# Patient Record
Sex: FEMALE | Race: WHITE | NOT HISPANIC OR LATINO | Employment: FULL TIME | URBAN - METROPOLITAN AREA
[De-identification: names, ages, dates, MRNs, and addresses within clinical notes are randomized per-mention and may not be internally consistent; named-entity substitution may affect disease eponyms.]

---

## 2023-04-03 NOTE — H&P (VIEW-ONLY)
GYNECOLOGIC ONCOLOGY CONSULT    Patient Name: KATHERINE JEFFERY Patient : 1950   Patient MRN: [id number]    Referring Physician: Karis Inman MD   Primary GYNOncologist: Rosetta Corley (Gynecological/Oncology)  Date of Service: 2023     Reason for Consult:  Treatment recommendations    History of Present Illness (Gyn Oncology):  Katherine Jeffery is a 72-year-old female, recently identified to have a palpable pelvic mass on her annual exam, who underwent a pelvic ultrasound identifying a 16.3 cm left adnexal mass, in addition to a 4.2 cm, solid right adnexal mass with a heterogeneous 10 mm endometrial lining of uncertain etiology. A CA-125 tumor marker was drawn and was normal at 34 u/mL. She presents today for treatment recommendations.    Clinical course:  3/10/23: Katherine presented for her annual wellness visit. She was experiencing lower abdominal pain that started approximately 4-6 months ago. Symptoms first started intermittently like menstrual cramps, but now had progressed to being daily. She was also having some lower abdominal bloating and urinary incontinence but denied any other concerns  3/13/23: Pelvic ultrasound showed the endometrium is abnormal and heterogeneous measuring 10 mm in a postmenopausal female. A 4.2 cm solid mass within the right adnexa and multiloculated cystic mass within the left adnexa measuring 16.3 cm. Findings are concerning for malignancy.  3/14/23:  = 34.4.  Genetic Testing (Gyn Oncology):  N/A    Interval History (Gyn Oncology):  Katherine presents today for treatment recommendations. She has not had any vaginal bleeding. She continues to have pelvic pain that feels like period cramps and radiates to her back and thigh areas on the right. She has some pressure on her bladder and has urinary incontinence. No blood in urine or stool. She denies any issues with her bowel habits. non nausea or vomiting. She denies any shortness of breath, chest pain, or tightness.  She denies any lightheadedness or dizziness. She denies any lower extremity swelling. She has been having nosebleeds only on the right side since winter and notes it intermittently once a month. Sometimes notices the bleeding by blowing her nose. She denies any pain in her nasal area. She has a history of left ear pain and was diagnosed with left sided tinnitus, symptoms of which have significantly improved.    Review of Systems:  A complete 14-point review of systems is negative except as noted in the above history of present illness.    Past Medical History:  Lower abdominal pain  Lower abdominal bloating  Hyperlipidemia  Hypertension  Glaucoma, bilateral  Catracts  Nosebleed, right  Endometrial thickening  Bilateral adnexal masses  Urinary incontinence  Chickenpox  Pneumonia x2  Left tinnitus  Low back pain, controlled on ibuprofen and Tylenol    Surgical History:  Colonoscopy   Cataract surgery bilateral  Blood transfusion once, couple of units due to placenta not separting during childbirths    OB/Gyn History:  Menarche age: 16  .  x4  LMP: Circa age 54. Regular menstrual cycles prior  Last mammogram 24. Normal  Last colonoscopy   Last Pap smear: Unknown. No abnormal Pap smears.  Denies history of HRT    Allergies#  NKA    Medications:  Multivitamins Oral Tablet  Calcium-Cholecalciferol Oral 600 mg-10 mcg (400 unit)  Cyanocobalamin Oral 2,500 mcg tablet x2  Lisinopril Oral 10 mg tablet  Omega-3 Fatty Acids Oral 1,000 mg capsule  Hydrochlorothiazide Oral 12.5 mg capsule  Lipitor (Atorvastatin Oral) 20 mg tablet  Family History:  Older brother - Pancreatic cancer,  from it  Father - Pancreatic cancer  Mother - Breast cancer.  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 Quit several years ago  No alcohol consumption. Drinks caffeine  No drug  use    Health Maintenance:    Vital Signs:  Blood pressure: 163/87, Pulse: 73, Temperature: 97.5 F, Respirations: 16, O2 sat: 98%, Pain Scale: 2, Height: 65.5 in, Weight: 175 lb, BSA: 1.88, BMI: 28.68 kg/m2     Physical Exam (Gyn Oncology):  General: Alert and oriented x3, no acute distress.  HEENT: Normocephalic, atraumatic.  Lymph node exam: No supraclavicular, submandibular, or cervical lymphadenopathy.  CV: Regular rate and rhythm, no murmurs, rubs or gallops.  Resp: Clear to auscultation bilaterally, no wheezes, rales, or rhonchi.  Abdomen: Soft, non-tender to palpation, no rebound or guarding.  Lower Extremity Exam: No lower extremity edema, no palpable cords.  Neuro: Cranial Nerves 2-12 intact, gross motor skills intact.  Genitourinary exam: Deferred.    Laboratory Data:    Imaging:  3/13/23: US Pelvis  IMPRESSION:  The endometrium is abnormal and heterogeneous measuring 10 mm in a postmenopausal female. A 4.2 cm solid mass within the right adnexa and multiloculated cystic mass within the left adnexa measuring 16.3 cm. Findings are concerning for malignancy.    Problems:  Pelvic mass  Uterine mass  Assessment & Plan (Gyn Oncology):  Ritu Jeffery is a 72-year-old female, recently identified to have a palpable pelvic mass on her annual exam, who underwent a pelvic ultrasound identifying a 16.3 cm left adnexal mass, in addition to a 4.2 cm, solid right adnexal mass with a heterogeneous 10 mm endometrial lining of uncertain etiology. A CA-125 tumor marker was drawn and was normal at 34 u/mL. She presents today for treatment recommendations.  Bilateral ovarian masses - Ritu, her , and I reviewed the large, multicystic, and complex-appearing left ovarian mass. We reviewed the normal CA-125 tumor marker at 34 u/mL, however, did discuss the need to proceed with surgical management, in order to truly and definitively rule out an underlying malignancy. We discussed this can be performed robotically with the  large cystic mass removed vaginally. We reviewed the risks of surgery, including bleeding or infection and potential damage to surrounding structures, including the bowel, bladder, and bilateral ureters. We reviewed general perioperative expectations. We discussed the need for a CT scan of the chest, abdomen, and pelvis, due to the adnexal masses of uncertain etiology.  Endometrial thickening - Ritu denies a history of postmenopausal bleeding. However, with an endometrial thickening of 10 mm, this needs to be surgically addressed. Being that we will be performing a procedure on the bilateral ovaries, we will plan to proceed with hysterectomy, which will also help facilitate removal of the 16 cm mass. We discussed the need for intraoperative frozen section analysis, in order to determine the etiology behind the endometrial thickening. We reviewed the risks of surgery as described above. We reviewed the potential performance of sentinel lymph node injection and biopsies. We will inject the cervix preoperatively, with ICG dye, in order to determine the sentinel lymph node mapping pathway, if an endometrial cancer is identified. I answered multiple questions to the best of my ability.  Thank you very much for allowing me to take part in the care of this very sweet patient.    3 minutes in reviewing records, 27 minutes in discussion, 2 minutes ordering labs.     Pain Care Management:  Pain Scale: 2    Patient Care needs:  Depressions Status: Was screened; Outcome positive: No; Screening Date: 03/23/2023; Screening Tool: PRIME MD-PHQ2; Total depression score: 0  Smoking Status: Smoking Tobacco : none found; Smokeless Tobacco : none found; Vaping : none found  Documentation assistance provided by toña Joe for Dr. Rosetta Corley, on 3/23/2023.  The patient and family/friends if present were apprised of the use of a scribe through remote viewing and all parties consented to conducting the visit in this  satish.    Rosetta Corley MD  Phone: [phone]  Fax: [phone]

## 2023-04-03 NOTE — CONSULTS
GYNECOLOGIC ONCOLOGY CONSULT    Patient Name: KATHERINE JEFFERY Patient : 1950   Patient MRN: [id number]    Referring Physician: Karis Inman MD   Primary GYNOncologist: Rosetta Corley (Gynecological/Oncology)  Date of Service: 2023     Reason for Consult:  Treatment recommendations    History of Present Illness (Gyn Oncology):  Katherine Jeffery is a 72-year-old female, recently identified to have a palpable pelvic mass on her annual exam, who underwent a pelvic ultrasound identifying a 16.3 cm left adnexal mass, in addition to a 4.2 cm, solid right adnexal mass with a heterogeneous 10 mm endometrial lining of uncertain etiology. A CA-125 tumor marker was drawn and was normal at 34 u/mL. She presents today for treatment recommendations.    Clinical course:  3/10/23: Katherine presented for her annual wellness visit. She was experiencing lower abdominal pain that started approximately 4-6 months ago. Symptoms first started intermittently like menstrual cramps, but now had progressed to being daily. She was also having some lower abdominal bloating and urinary incontinence but denied any other concerns  3/13/23: Pelvic ultrasound showed the endometrium is abnormal and heterogeneous measuring 10 mm in a postmenopausal female. A 4.2 cm solid mass within the right adnexa and multiloculated cystic mass within the left adnexa measuring 16.3 cm. Findings are concerning for malignancy.  3/14/23:  = 34.4.  Genetic Testing (Gyn Oncology):  N/A    Interval History (Gyn Oncology):  Katherine presents today for treatment recommendations. She has not had any vaginal bleeding. She continues to have pelvic pain that feels like period cramps and radiates to her back and thigh areas on the right. She has some pressure on her bladder and has urinary incontinence. No blood in urine or stool. She denies any issues with her bowel habits. non nausea or vomiting. She denies any shortness of breath, chest pain, or tightness.  She denies any lightheadedness or dizziness. She denies any lower extremity swelling. She has been having nosebleeds only on the right side since winter and notes it intermittently once a month. Sometimes notices the bleeding by blowing her nose. She denies any pain in her nasal area. She has a history of left ear pain and was diagnosed with left sided tinnitus, symptoms of which have significantly improved.    Review of Systems:  A complete 14-point review of systems is negative except as noted in the above history of present illness.    Past Medical History:  Lower abdominal pain  Lower abdominal bloating  Hyperlipidemia  Hypertension  Glaucoma, bilateral  Catracts  Nosebleed, right  Endometrial thickening  Bilateral adnexal masses  Urinary incontinence  Chickenpox  Pneumonia x2  Left tinnitus  Low back pain, controlled on ibuprofen and Tylenol    Surgical History:  Colonoscopy   Cataract surgery bilateral  Blood transfusion once, couple of units due to placenta not separting during childbirths    OB/Gyn History:  Menarche age: 16  .  x4  LMP: Circa age 54. Regular menstrual cycles prior  Last mammogram 23. Normal  Last colonoscopy   Last Pap smear: Unknown. No abnormal Pap smears.  Denies history of HRT    Allergies#  NKA    Medications:  Multivitamins Oral Tablet  Calcium-Cholecalciferol Oral 600 mg-10 mcg (400 unit)  Cyanocobalamin Oral 2,500 mcg tablet x2  Lisinopril Oral 10 mg tablet  Omega-3 Fatty Acids Oral 1,000 mg capsule  Hydrochlorothiazide Oral 12.5 mg capsule  Lipitor (Atorvastatin Oral) 20 mg tablet  Family History:  Older brother - Pancreatic cancer,  from it  Father - Pancreatic cancer  Mother - Breast cancer.  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 Quit several years ago  No alcohol consumption. Drinks caffeine  No drug  use    Health Maintenance:    Vital Signs:  Blood pressure: 163/87, Pulse: 73, Temperature: 97.5 F, Respirations: 16, O2 sat: 98%, Pain Scale: 2, Height: 65.5 in, Weight: 175 lb, BSA: 1.88, BMI: 28.68 kg/m2     Physical Exam (Gyn Oncology):  General: Alert and oriented x3, no acute distress.  HEENT: Normocephalic, atraumatic.  Lymph node exam: No supraclavicular, submandibular, or cervical lymphadenopathy.  CV: Regular rate and rhythm, no murmurs, rubs or gallops.  Resp: Clear to auscultation bilaterally, no wheezes, rales, or rhonchi.  Abdomen: Soft, non-tender to palpation, no rebound or guarding.  Lower Extremity Exam: No lower extremity edema, no palpable cords.  Neuro: Cranial Nerves 2-12 intact, gross motor skills intact.  Genitourinary exam: Deferred.    Laboratory Data:    Imaging:  3/13/23: US Pelvis  IMPRESSION:  The endometrium is abnormal and heterogeneous measuring 10 mm in a postmenopausal female. A 4.2 cm solid mass within the right adnexa and multiloculated cystic mass within the left adnexa measuring 16.3 cm. Findings are concerning for malignancy.    Problems:  Pelvic mass  Uterine mass  Assessment & Plan (Gyn Oncology):  Ritu Jeffery is a 72-year-old female, recently identified to have a palpable pelvic mass on her annual exam, who underwent a pelvic ultrasound identifying a 16.3 cm left adnexal mass, in addition to a 4.2 cm, solid right adnexal mass with a heterogeneous 10 mm endometrial lining of uncertain etiology. A CA-125 tumor marker was drawn and was normal at 34 u/mL. She presents today for treatment recommendations.  Bilateral ovarian masses - Ritu, her , and I reviewed the large, multicystic, and complex-appearing left ovarian mass. We reviewed the normal CA-125 tumor marker at 34 u/mL, however, did discuss the need to proceed with surgical management, in order to truly and definitively rule out an underlying malignancy. We discussed this can be performed robotically with the  large cystic mass removed vaginally. We reviewed the risks of surgery, including bleeding or infection and potential damage to surrounding structures, including the bowel, bladder, and bilateral ureters. We reviewed general perioperative expectations. We discussed the need for a CT scan of the chest, abdomen, and pelvis, due to the adnexal masses of uncertain etiology.  Endometrial thickening - Ritu denies a history of postmenopausal bleeding. However, with an endometrial thickening of 10 mm, this needs to be surgically addressed. Being that we will be performing a procedure on the bilateral ovaries, we will plan to proceed with hysterectomy, which will also help facilitate removal of the 16 cm mass. We discussed the need for intraoperative frozen section analysis, in order to determine the etiology behind the endometrial thickening. We reviewed the risks of surgery as described above. We reviewed the potential performance of sentinel lymph node injection and biopsies. We will inject the cervix preoperatively, with ICG dye, in order to determine the sentinel lymph node mapping pathway, if an endometrial cancer is identified. I answered multiple questions to the best of my ability.  Thank you very much for allowing me to take part in the care of this very sweet patient.    3 minutes in reviewing records, 27 minutes in discussion, 2 minutes ordering labs.     Pain Care Management:  Pain Scale: 2    Patient Care needs:  Depressions Status: Was screened; Outcome positive: No; Screening Date: 03/23/2023; Screening Tool: PRIME MD-PHQ2; Total depression score: 0  Smoking Status: Smoking Tobacco : none found; Smokeless Tobacco : none found; Vaping : none found  Documentation assistance provided by toña Joe for Dr. Rosetta Corley, on 3/23/2023.  The patient and family/friends if present were apprised of the use of a scribe through remote viewing and all parties consented to conducting the visit in this  satish.    Rosetta Corley MD  Phone: [phone]  Fax: [phone]

## 2023-04-10 RX ORDER — MULTIPLE VITAMINS W/ MINERALS TAB 9MG-400MCG
1 TAB ORAL DAILY
COMMUNITY

## 2023-04-10 RX ORDER — CHLORAL HYDRATE 500 MG
1 CAPSULE ORAL DAILY
COMMUNITY

## 2023-04-10 RX ORDER — CALCIUM CARBONATE/VITAMIN D3 600 MG-10
1 TABLET ORAL DAILY
COMMUNITY

## 2023-04-10 RX ORDER — ATORVASTATIN CALCIUM 20 MG/1
20 TABLET, FILM COATED ORAL DAILY
COMMUNITY

## 2023-04-10 RX ORDER — LISINOPRIL 10 MG/1
10 TABLET ORAL DAILY
COMMUNITY

## 2023-04-10 RX ORDER — HYDROCHLOROTHIAZIDE 12.5 MG/1
12.5 CAPSULE ORAL DAILY
COMMUNITY

## 2023-04-11 ENCOUNTER — ANESTHESIA (OUTPATIENT)
Dept: SURGERY | Facility: HOSPITAL | Age: 73
End: 2023-04-11
Payer: MEDICARE

## 2023-04-11 ENCOUNTER — ANESTHESIA EVENT (OUTPATIENT)
Dept: SURGERY | Facility: HOSPITAL | Age: 73
End: 2023-04-11
Payer: MEDICARE

## 2023-04-11 ENCOUNTER — HOSPITAL ENCOUNTER (OUTPATIENT)
Facility: HOSPITAL | Age: 73
Discharge: HOME | End: 2023-04-11
Attending: OBSTETRICS & GYNECOLOGY | Admitting: OBSTETRICS & GYNECOLOGY
Payer: MEDICARE

## 2023-04-11 VITALS
WEIGHT: 173.4 LBS | TEMPERATURE: 98 F | DIASTOLIC BLOOD PRESSURE: 70 MMHG | RESPIRATION RATE: 18 BRPM | SYSTOLIC BLOOD PRESSURE: 161 MMHG | OXYGEN SATURATION: 97 % | HEART RATE: 69 BPM

## 2023-04-11 DIAGNOSIS — G89.18 POSTOPERATIVE PAIN: Primary | ICD-10-CM

## 2023-04-11 PROCEDURE — 250N000011 HC RX IP 250 OP 636: Performed by: NURSE ANESTHETIST, CERTIFIED REGISTERED

## 2023-04-11 PROCEDURE — 258N000003 HC RX IP 258 OP 636: Performed by: ANESTHESIOLOGY

## 2023-04-11 PROCEDURE — 258N000001 HC RX 258: Performed by: OBSTETRICS & GYNECOLOGY

## 2023-04-11 PROCEDURE — 88305 TISSUE EXAM BY PATHOLOGIST: CPT | Mod: TC | Performed by: OBSTETRICS & GYNECOLOGY

## 2023-04-11 PROCEDURE — 250N000013 HC RX MED GY IP 250 OP 250 PS 637: Performed by: ANESTHESIOLOGY

## 2023-04-11 PROCEDURE — 250N000025 HC SEVOFLURANE, PER MIN: Performed by: OBSTETRICS & GYNECOLOGY

## 2023-04-11 PROCEDURE — 250N000011 HC RX IP 250 OP 636: Performed by: PHYSICIAN ASSISTANT

## 2023-04-11 PROCEDURE — 710N000009 HC RECOVERY PHASE 1, LEVEL 1, PER MIN: Performed by: OBSTETRICS & GYNECOLOGY

## 2023-04-11 PROCEDURE — 272N000001 HC OR GENERAL SUPPLY STERILE: Performed by: OBSTETRICS & GYNECOLOGY

## 2023-04-11 PROCEDURE — 258N000003 HC RX IP 258 OP 636: Performed by: NURSE ANESTHETIST, CERTIFIED REGISTERED

## 2023-04-11 PROCEDURE — 360N000080 HC SURGERY LEVEL 7, PER MIN: Performed by: OBSTETRICS & GYNECOLOGY

## 2023-04-11 PROCEDURE — 999N000141 HC STATISTIC PRE-PROCEDURE NURSING ASSESSMENT: Performed by: OBSTETRICS & GYNECOLOGY

## 2023-04-11 PROCEDURE — 250N000009 HC RX 250: Performed by: OBSTETRICS & GYNECOLOGY

## 2023-04-11 PROCEDURE — 710N000012 HC RECOVERY PHASE 2, PER MINUTE: Performed by: OBSTETRICS & GYNECOLOGY

## 2023-04-11 PROCEDURE — 88307 TISSUE EXAM BY PATHOLOGIST: CPT | Mod: TC | Performed by: OBSTETRICS & GYNECOLOGY

## 2023-04-11 PROCEDURE — 250N000011 HC RX IP 250 OP 636: Performed by: ANESTHESIOLOGY

## 2023-04-11 PROCEDURE — 370N000017 HC ANESTHESIA TECHNICAL FEE, PER MIN: Performed by: OBSTETRICS & GYNECOLOGY

## 2023-04-11 PROCEDURE — 250N000009 HC RX 250: Performed by: NURSE ANESTHETIST, CERTIFIED REGISTERED

## 2023-04-11 RX ORDER — CEFAZOLIN SODIUM/WATER 2 G/20 ML
2 SYRINGE (ML) INTRAVENOUS
Status: COMPLETED | OUTPATIENT
Start: 2023-04-11 | End: 2023-04-11

## 2023-04-11 RX ORDER — AMOXICILLIN 250 MG
1 CAPSULE ORAL 2 TIMES DAILY PRN
COMMUNITY
Start: 2023-04-11

## 2023-04-11 RX ORDER — DEXAMETHASONE SODIUM PHOSPHATE 10 MG/ML
INJECTION, SOLUTION INTRAMUSCULAR; INTRAVENOUS PRN
Status: DISCONTINUED | OUTPATIENT
Start: 2023-04-11 | End: 2023-04-11

## 2023-04-11 RX ORDER — OXYCODONE HYDROCHLORIDE 5 MG/1
5 TABLET ORAL EVERY 4 HOURS PRN
Qty: 10 TABLET | Refills: 0 | Status: SHIPPED | OUTPATIENT
Start: 2023-04-11 | End: 2023-04-14

## 2023-04-11 RX ORDER — ACETAMINOPHEN 325 MG/1
975 TABLET ORAL EVERY 6 HOURS PRN
Status: DISCONTINUED | OUTPATIENT
Start: 2023-04-11 | End: 2023-04-14 | Stop reason: HOSPADM

## 2023-04-11 RX ORDER — HALOPERIDOL 5 MG/ML
1 INJECTION INTRAMUSCULAR
Status: DISCONTINUED | OUTPATIENT
Start: 2023-04-11 | End: 2023-04-11 | Stop reason: HOSPADM

## 2023-04-11 RX ORDER — PROPOFOL 10 MG/ML
INJECTION, EMULSION INTRAVENOUS CONTINUOUS PRN
Status: DISCONTINUED | OUTPATIENT
Start: 2023-04-11 | End: 2023-04-11

## 2023-04-11 RX ORDER — MAGNESIUM SULFATE 4 G/50ML
4 INJECTION INTRAVENOUS ONCE
Status: COMPLETED | OUTPATIENT
Start: 2023-04-11 | End: 2023-04-11

## 2023-04-11 RX ORDER — SODIUM CHLORIDE, SODIUM LACTATE, POTASSIUM CHLORIDE, CALCIUM CHLORIDE 600; 310; 30; 20 MG/100ML; MG/100ML; MG/100ML; MG/100ML
INJECTION, SOLUTION INTRAVENOUS CONTINUOUS
Status: DISCONTINUED | OUTPATIENT
Start: 2023-04-11 | End: 2023-04-11 | Stop reason: HOSPADM

## 2023-04-11 RX ORDER — IBUPROFEN 200 MG
200-600 TABLET ORAL EVERY 6 HOURS PRN
COMMUNITY
Start: 2023-04-11

## 2023-04-11 RX ORDER — FENTANYL CITRATE 50 UG/ML
25 INJECTION, SOLUTION INTRAMUSCULAR; INTRAVENOUS
Status: DISCONTINUED | OUTPATIENT
Start: 2023-04-11 | End: 2023-04-14 | Stop reason: HOSPADM

## 2023-04-11 RX ORDER — CEFAZOLIN SODIUM/WATER 2 G/20 ML
2 SYRINGE (ML) INTRAVENOUS SEE ADMIN INSTRUCTIONS
Status: DISCONTINUED | OUTPATIENT
Start: 2023-04-11 | End: 2023-04-11 | Stop reason: HOSPADM

## 2023-04-11 RX ORDER — HYDROXYZINE HYDROCHLORIDE 25 MG/1
25 TABLET, FILM COATED ORAL
Status: DISCONTINUED | OUTPATIENT
Start: 2023-04-11 | End: 2023-04-14 | Stop reason: HOSPADM

## 2023-04-11 RX ORDER — FENTANYL CITRATE 50 UG/ML
50 INJECTION, SOLUTION INTRAMUSCULAR; INTRAVENOUS EVERY 5 MIN PRN
Status: DISCONTINUED | OUTPATIENT
Start: 2023-04-11 | End: 2023-04-11 | Stop reason: HOSPADM

## 2023-04-11 RX ORDER — BUPIVACAINE HYDROCHLORIDE 5 MG/ML
INJECTION, SOLUTION PERINEURAL PRN
Status: DISCONTINUED | OUTPATIENT
Start: 2023-04-11 | End: 2023-04-11 | Stop reason: HOSPADM

## 2023-04-11 RX ORDER — PROPOFOL 10 MG/ML
INJECTION, EMULSION INTRAVENOUS PRN
Status: DISCONTINUED | OUTPATIENT
Start: 2023-04-11 | End: 2023-04-11

## 2023-04-11 RX ORDER — WATER 10 ML/10ML
INJECTION INTRAMUSCULAR; INTRAVENOUS; SUBCUTANEOUS
Status: DISCONTINUED
Start: 2023-04-11 | End: 2023-04-11 | Stop reason: HOSPADM

## 2023-04-11 RX ORDER — ONDANSETRON 4 MG/1
4 TABLET, ORALLY DISINTEGRATING ORAL EVERY 30 MIN PRN
Status: DISCONTINUED | OUTPATIENT
Start: 2023-04-11 | End: 2023-04-11 | Stop reason: HOSPADM

## 2023-04-11 RX ORDER — ONDANSETRON 2 MG/ML
4 INJECTION INTRAMUSCULAR; INTRAVENOUS EVERY 30 MIN PRN
Status: DISCONTINUED | OUTPATIENT
Start: 2023-04-11 | End: 2023-04-11 | Stop reason: HOSPADM

## 2023-04-11 RX ORDER — LABETALOL HYDROCHLORIDE 5 MG/ML
5 INJECTION, SOLUTION INTRAVENOUS EVERY 10 MIN PRN
Status: DISCONTINUED | OUTPATIENT
Start: 2023-04-11 | End: 2023-04-11 | Stop reason: HOSPADM

## 2023-04-11 RX ORDER — OXYCODONE HYDROCHLORIDE 5 MG/1
5 TABLET ORAL
Status: COMPLETED | OUTPATIENT
Start: 2023-04-11 | End: 2023-04-11

## 2023-04-11 RX ORDER — ACETAMINOPHEN 325 MG/1
975 TABLET ORAL ONCE
Status: COMPLETED | OUTPATIENT
Start: 2023-04-11 | End: 2023-04-11

## 2023-04-11 RX ORDER — INDOCYANINE GREEN AND WATER 25 MG
KIT INJECTION PRN
Status: DISCONTINUED | OUTPATIENT
Start: 2023-04-11 | End: 2023-04-11 | Stop reason: HOSPADM

## 2023-04-11 RX ORDER — LIDOCAINE HYDROCHLORIDE 10 MG/ML
INJECTION, SOLUTION INFILTRATION; PERINEURAL PRN
Status: DISCONTINUED | OUTPATIENT
Start: 2023-04-11 | End: 2023-04-11

## 2023-04-11 RX ORDER — OXYCODONE HYDROCHLORIDE 5 MG/1
10 TABLET ORAL
Status: DISCONTINUED | OUTPATIENT
Start: 2023-04-11 | End: 2023-04-14 | Stop reason: HOSPADM

## 2023-04-11 RX ORDER — ONDANSETRON 2 MG/ML
INJECTION INTRAMUSCULAR; INTRAVENOUS PRN
Status: DISCONTINUED | OUTPATIENT
Start: 2023-04-11 | End: 2023-04-11

## 2023-04-11 RX ORDER — WATER 10 ML/10ML
INJECTION INTRAMUSCULAR; INTRAVENOUS; SUBCUTANEOUS PRN
Status: DISCONTINUED | OUTPATIENT
Start: 2023-04-11 | End: 2023-04-11 | Stop reason: HOSPADM

## 2023-04-11 RX ORDER — LIDOCAINE 40 MG/G
CREAM TOPICAL
Status: DISCONTINUED | OUTPATIENT
Start: 2023-04-11 | End: 2023-04-11 | Stop reason: HOSPADM

## 2023-04-11 RX ORDER — ACETAMINOPHEN 500 MG
500-1000 TABLET ORAL EVERY 6 HOURS PRN
COMMUNITY
Start: 2023-04-11

## 2023-04-11 RX ORDER — FENTANYL CITRATE 50 UG/ML
25 INJECTION, SOLUTION INTRAMUSCULAR; INTRAVENOUS EVERY 5 MIN PRN
Status: DISCONTINUED | OUTPATIENT
Start: 2023-04-11 | End: 2023-04-11 | Stop reason: HOSPADM

## 2023-04-11 RX ORDER — IBUPROFEN 200 MG
600 TABLET ORAL EVERY 6 HOURS PRN
Status: DISCONTINUED | OUTPATIENT
Start: 2023-04-11 | End: 2023-04-14 | Stop reason: HOSPADM

## 2023-04-11 RX ORDER — FENTANYL CITRATE 50 UG/ML
INJECTION, SOLUTION INTRAMUSCULAR; INTRAVENOUS PRN
Status: DISCONTINUED | OUTPATIENT
Start: 2023-04-11 | End: 2023-04-11

## 2023-04-11 RX ORDER — OXYCODONE HYDROCHLORIDE 5 MG/1
5 TABLET ORAL EVERY 4 HOURS PRN
Status: DISCONTINUED | OUTPATIENT
Start: 2023-04-11 | End: 2023-04-14 | Stop reason: HOSPADM

## 2023-04-11 RX ADMIN — SUGAMMADEX 200 MG: 100 INJECTION, SOLUTION INTRAVENOUS at 17:42

## 2023-04-11 RX ADMIN — FENTANYL CITRATE 25 MCG: 50 INJECTION, SOLUTION INTRAMUSCULAR; INTRAVENOUS at 15:38

## 2023-04-11 RX ADMIN — PHENYLEPHRINE HYDROCHLORIDE 100 MCG: 10 INJECTION INTRAVENOUS at 16:42

## 2023-04-11 RX ADMIN — OXYCODONE HYDROCHLORIDE 5 MG: 5 TABLET ORAL at 20:42

## 2023-04-11 RX ADMIN — HYDROMORPHONE HYDROCHLORIDE 0.2 MG: 1 INJECTION, SOLUTION INTRAMUSCULAR; INTRAVENOUS; SUBCUTANEOUS at 18:28

## 2023-04-11 RX ADMIN — FENTANYL CITRATE 25 MCG: 50 INJECTION, SOLUTION INTRAMUSCULAR; INTRAVENOUS at 15:41

## 2023-04-11 RX ADMIN — SODIUM CHLORIDE, POTASSIUM CHLORIDE, SODIUM LACTATE AND CALCIUM CHLORIDE: 600; 310; 30; 20 INJECTION, SOLUTION INTRAVENOUS at 12:54

## 2023-04-11 RX ADMIN — PHENYLEPHRINE HYDROCHLORIDE 100 MCG: 10 INJECTION INTRAVENOUS at 16:39

## 2023-04-11 RX ADMIN — PROPOFOL 50 MCG/KG/MIN: 10 INJECTION, EMULSION INTRAVENOUS at 15:46

## 2023-04-11 RX ADMIN — ROCURONIUM BROMIDE 10 MG: 50 INJECTION, SOLUTION INTRAVENOUS at 16:37

## 2023-04-11 RX ADMIN — HYDROMORPHONE HYDROCHLORIDE 0.4 MG: 1 INJECTION, SOLUTION INTRAMUSCULAR; INTRAVENOUS; SUBCUTANEOUS at 18:35

## 2023-04-11 RX ADMIN — DEXAMETHASONE SODIUM PHOSPHATE 10 MG: 10 INJECTION, SOLUTION INTRAMUSCULAR; INTRAVENOUS at 15:49

## 2023-04-11 RX ADMIN — Medication 2 G: at 15:34

## 2023-04-11 RX ADMIN — ROCURONIUM BROMIDE 50 MG: 50 INJECTION, SOLUTION INTRAVENOUS at 15:43

## 2023-04-11 RX ADMIN — FENTANYL CITRATE 50 MCG: 50 INJECTION, SOLUTION INTRAMUSCULAR; INTRAVENOUS at 17:59

## 2023-04-11 RX ADMIN — MAGNESIUM SULFATE HEPTAHYDRATE 4 G: 80 INJECTION, SOLUTION INTRAVENOUS at 12:55

## 2023-04-11 RX ADMIN — ACETAMINOPHEN 975 MG: 325 TABLET ORAL at 12:44

## 2023-04-11 RX ADMIN — LIDOCAINE HYDROCHLORIDE 30 MG: 10 INJECTION, SOLUTION INFILTRATION; PERINEURAL at 15:43

## 2023-04-11 RX ADMIN — SODIUM CHLORIDE, POTASSIUM CHLORIDE, SODIUM LACTATE AND CALCIUM CHLORIDE: 600; 310; 30; 20 INJECTION, SOLUTION INTRAVENOUS at 16:47

## 2023-04-11 RX ADMIN — FENTANYL CITRATE 50 MCG: 50 INJECTION, SOLUTION INTRAMUSCULAR; INTRAVENOUS at 15:31

## 2023-04-11 RX ADMIN — ONDANSETRON 4 MG: 2 INJECTION INTRAMUSCULAR; INTRAVENOUS at 17:31

## 2023-04-11 RX ADMIN — FENTANYL CITRATE 50 MCG: 50 INJECTION, SOLUTION INTRAMUSCULAR; INTRAVENOUS at 18:08

## 2023-04-11 RX ADMIN — PROPOFOL 200 MG: 10 INJECTION, EMULSION INTRAVENOUS at 15:43

## 2023-04-11 ASSESSMENT — ACTIVITIES OF DAILY LIVING (ADL)
ADLS_ACUITY_SCORE: 35

## 2023-04-11 NOTE — PROCEDURES
DATE OF SERVICE:    4/11/2023      SURGEON:    Rosetta Corley MD    ASSISTANT:   Marisol Giles PA-C     PREOPERATIVE DIAGNOSIS:   16.3 cm left pelvic mass  4.2 cm solid right ovarian mass  Endometrial thickening  Normal  tumor marker    POSTOPERATIVE DIAGNOSIS:   Same  Reactive peritoneal changes    PROCEDURE:  Pelvic washings, robotic assisted total laparoscopic hysterectomy, bilateral salpingo-oophorectomy, omental biopsy, peritoneal biopsy     ANESTHESIA:    General endotracheal     COMPLICATIONS:  None.     ESTIMATED BLOOD LOSS :   30 mL    IV FLUIDS:    1000 mL LR    URINE OUTPUT:   Not recorded    FINDINGS:  Normal upper peritoneal cavity.  Normal-appearing bilateral diaphragms.  Normal liver capsule, normal stomach body, normal omentum.  Within the lower half of the pelvis, there was a gritty, whitish hued surface change of uncertain etiology covering the sigmoid colon, the terminal ileum, the cecum, the appendix and all pelvic peritoneal tissues.  The left ovary and fallopian tube were substantially enlarged to at least above the pelvic brim.  The right ovary appeared solid and mildly enlarged.  The uterus overall from an external appearance appear grossly within normal limits.  Frozen section analysis was consistent with a benign right ovarian Jonathan tumor, a benign appearing mucinous ovarian tumor on the left, benign endometrial gross appearance.  There were mucinous changes to the inferior aspect of the omentum and due to the overall reactive changes, an infracolic omental biopsy was taken.  Frozen section analysis of a right pelvic peritoneal biopsy returned very reactive mesothelial cells, no evidence of adenocarcinoma.     PROCEDURE IN DETAIL:  Ritu Jeffery is a pleasant 72 year old-year-old with a new diagnosis of a 16.3 left complex appearing pelvic mass, a solid-appearing 4.2 cm right ovarian mass and a heterogeneous 10 mm endometrial lining of uncertain etiology.  She had a 4  to 6-month history of reported intermittent abdominal pain that have progressed over time.  A Ca1 25 tumor marker was normal at 34.4 units/mL.  The patient was counseled as to the above stated procedure.  The risks, benefits and alternatives were reviewed in detail.  The consent was signed in the preoperative area and the patient was subsequently where general anesthesia was found to be adequate. She was prepared and draped in the normal sterile fashion in lithotomy positioning. Her arms were padded and tucked at her sides.  A timeout and briefing were performed in the operating room.    At the start of the case, an open sided speculum was placed within the vagina, and the anterior lip of the cervix grasped with the single-toothed tenaculum. The uterine sound was used to cannulate the cervix. A medium V-care was advanced into the endocervical canal. The V-care was seated, and attention was turned to the abdomen.  A 2.5 mg/mL solution of ICG was injected into the uterine cervix at 3 and 9:00 in the event that endometrial pathology was uncovered on frozen section analysis.  A 30 degree robotic camera scope was utilized.    A supraumbilical incision was made roughly 27cm above the pubic symphysis. The 5 mm laparoscopic camera was introduced into the abdomen with an 8 mm optical viewing trocar. Following confirmation of entrance into the abdomen by visual inspection of the intraperitoneal space, the abdomen was insufflated to 15 mm Hg. Additional port sites were mapped out 11 cm lateral with a 15 degree drop to each side of the camera port site. The right lower quadrant port site for assistant manipulation was placed 2 cm cephalad and medial to the ASIS. All incisions were 8 mm. The left lower quadrant port site was mapped out lateral to the ostomy, within the mid-left abdomen. The patient was placed in steep trendelenberg. The bowel fell freely into the upper abdomen. The robot was then docked.  It was noted that there  was a substantial amount of peritoneal thickening with a surface eschar of uncertain etiology.  A peritoneal biopsy overlying the right psoas muscle was undertaken.  The peritoneum was subsequently sent for frozen section analysis.    At the start of the procedure, the mass was identified to be arising from the left ovary and fallopian tube.  The left round ligament was transected with monopolar energy.  The pelvic peritoneum along the left pelvic sidewall was opened just parallel to the gonadal vessels without difficulty. The para-rectal space was developed and the ureter found coursing within the posterior medial leaf. The gray space bound by the gonadal vessels, ureter and utero-ovarian pedicle was developed. The utero-ovarian pedicle was then coagulated, sealed and transected.  By utilizing the robotic vessel sealing device, the gonadal vessels were subsequently isolated, skeletonized and were coagulated, sealed and transected.  The left ovary and fallopian tube were allowed to remain within the upper abdomen.  Attention was turned to the patient's right pelvic sidewall.  The right round ligament was grasped, dissected and the peritoneum was opened parallel and lateral to the IP ligament in a cephalad fashion. The left para-rectal space and medial leaf of the broad ligament were developed, and the ureter was visualized along the medial posterior aspect. The gray space bound by the IP ligament, the utero-ovarian ligament and the ureter was opened sharply. The gray space was entered bluntly, and the IP ligament with the gonadal vessels was identified.  The right gonadal vessels were elevated and were subsequently skeletonized.  Following, they were coagulated, sealed and transected.  The posterior leaf was taken down to the uterosacral ligament posteriorly.  Attention was turned anterior.  The vesicouterine peritoneum was transected and following, the pubocervical fascia was developed bluntly.  The bladder flap  was further developed over the anterior cervix to fully expose the anterior colpotomy ring. The uterine vessels were then skeletonized bilaterally and following where coagulated, sealed and transected. The cardinal ligaments were secondarily taken down over the colpotomy ring bilaterally. The colpotomy was initiated anteriorly, and then circumferentially completed. The uterus right ovary and fallopian tube were delivered vaginally and sent for frozen section analysis.  The left adnexa was then collected vaginally within a 25 cm endocatch bag. The cystic mass was then ruptured vaginally within the bag and delivered.  There was a substantial amount of mucin delivered from the mass.  The left adnexa was then sent for frozen section analysis.     Due to the peritoneal findings, attention was turned to an omental biopsy. The omental biopsy was undertaken by grasping the omentum and pulling it inferior to the pelvis. Intervening windows between the omental vessels were developed with the robotic vessel sealing device and the omental vessels at the mid-right omentum coagulated, sealed and transected. Following, the gastroepiploic vessels on the hepatic aspect of the omentum were coagulated, sealed and transected. The loose, areolar adhesions to the transverse colon were dissected and transected with the robotic vessel sealing device.  While remaining infracolic, the intervening omental vessels were sealed and transected with the robotic vessel sealing device in the direction of the splenic flexure. The gastro-epiploic vessels on the splenic aspect were sealed and transected. The omental biopsy was then delivered transvaginally.The vagina was then closed with a Stratafix 0-PDS barbed unidirectional suture. The pelvis was irrigated and all surgical sites were found to be hemostatic.  The hemostatic agent, Surgicel snow was placed over the vaginal cuff.  Frozen section analysis subsequently returned as a benign right ovarian  Jonathan tumor, benign appearing uterus and benign left ovarian mucinous cystadenoma.    The pelvis was irrigated and all surgical sites were found to be hemostatic. All robotic instruments were then removed from the patient's abdomen, and the robot was un-docked. The skin incisions were re-approximated with a 3-0 subcuticular stitch followed by an overlying layer of dermabond.     The vagina was inspected and found to be hemostatic. All sponges, needles and instrument counts were correct x2. She was taken to the recovery room in a stable condition.     Marisol Giles PA-C assisted me throughout the case and was paramount in the completion of all vital portions.  She assisted with delivery of all specimens, placement of the trocars, placement of the uterine manipulator, suctioning, adequate visualization and closure.    Rosetta Corley MD  [facility]  Gynecologic Oncologist  [facility]  [phone]    who was brought to the the operating room for a the above stated procedure where general anesthesia was found to be adequate. She was prepared and draped in the normal sterile fashion in lithotomy positioning. Her arms were padded and tucked at her sides. A time out was performed and the staff in the room were educated on the planned procedure.      At the start of the case, the joseph catheter was anchored within the urethra and an open sided speculum was placed within the vagina. The anterior lip of the cervix grasped with the single-toothed tenaculum. The uterine sound was used to cannulate the cervix. A small V-care was advanced into the endocervical canal. The V-care was seated. Attention was then turned to the abdomen.      A left upper quadrant entry was planned. Rivero's point was identified at the mid-clavicular line, just 1 cm below the costal margin. A 5 mm skin incision was created. An orogastric tube was in place and to low-intermittent suction. The 5 mm laparoscopic camera was  introduced into the abdomen with a 5 mm optical viewing trocar. Following confirmation of entrance into the abdomen by visual inspection of the intraperitoneal space, the abdomen was insufflated to 15 mm Hg. The patient was placed in steep trendelenberg. The camera port site was mapped out 28 cm cephalad to the pubic symphysis. Additional port sites were mapped out 11 cm lateral with a 15 degree drop to each side of the camera port site. The right lower quadrant port site for assistant manipulation was placed 2 cm cephalad and medial to the ASIS. The left lower quadrant port site was mapped out an additional 11 cm lateral to the mid-left port site. The bowel was packed within the upper abdomen. The robot was then docked. Pelvic washings were obtained.      The instruments were all cannulated. The hysterectomy was initiated. The right round ligament was held on tension and transected with monopolar energy. The peritoneum along the right pelvic sidewall was developed by developing the peritoneum just lateral and parallel to the gonadal vessels. The para-rectal space was developed between the ureter and the internal iliac artery. The medial leaf of the broad ligament was then developed, and the ureter was visualized along the medial posterior aspect. The gray space bound by the IP ligament, the utero-ovarian ligament and the ureter was opened sharply. The gonadal vessels were then held directly anterior, and following were skeletonized, coagulated, sealed and transected with the bipolar forceps. The posterior leaf was taken down to the uterosacral ligament on the right. Attention was turned to the left pelvic sidewall. In a similar fashion, round ligament and left pelvic peritoneum were similarly opened and developed. The round ligament was held on tension and transected with monopolar energy. The para-rectal space was opened and developed with the same boundaries. The gray space was similarly developed by holding the  gonadal vessels on stretch anteriorly and opening the gray space with the boundaries described above. The gonadal vessels were similarly skeletonized, coagulated, sealed and transected. The posterior leaf was similarly dissected to the left uterosacral ligament on the left. The anterior leaf was dissected to initiate the bladder flap by transecting the vesicouterine peritoneum. The pubocervical fascia was developed over the anterior cervix to fully expose the anterior colpotomy ring. Attention was turned to the uterine vessels bilaterally. These were skeletonized, coagulated with the bipolar forceps and transected with the monopolar nunu. The cardinal ligament was then coagulated by holding the bipolar forceps parallel to the uterine cervix, and secondarily dissected away from the lateral cervix with the monopolar nunu. Attention was turned posterior, and the colopotomy inititiated. The colpotomy was then circumferentially . The right utero-ovarian pedicle was transected and the uterus, left fallopian tube and left ovary were delivered with a single-toothed tenaculum placed vaginally. The right ovary and fallopian tube were placed within an endocatch bag and delivered vaginally.  The right fallopian tube and ovary were sent for frozen section analysis, and the remainder sent for standard processing. Frozen section returned consistent with a benign right ovarian cystadenofibroma.     The vagina was then closed with a Stratafix 2-0 PDS barbed unidirectional suture. The pelvis was irrigated and all surgical sites were irrigated and found to be hemostatic. At the completion, all robotic instruments were removed from the patient's abdomen, and the robot was un-docked. The skin incisions were re-approximated with a 3-0 subcuticular stitch followed by an overlying layer of dermabond.      The vagina was inspected and found to be hemostatic. All sponges, needles and instrument counts were correct x2. She was taken to  the recovery room in a stable condition.      Marisol Giles PA-C assisted me throughout the case and was paramount in the completion of all vital portions. She assisted with port placement, retraction, delivery of the specimens and closure.      Rosetta Corley MD  [facility]  Gynecologic Oncologist  [place] [facility]  [phone]

## 2023-04-11 NOTE — ANESTHESIA CARE TRANSFER NOTE
Patient: Ritu Jeffery    Procedure: Procedure(s):  ROBOTIC ASSISTED TOTAL LAPAROSCOPIC HYSTERECTOMY, BILATERAL SALPINGO OOPHORECTOMY, OMENTAL BIOPSY       Diagnosis: Pelvic mass [R19.00]  Diagnosis Additional Information: No value filed.    Anesthesia Type:   General     Note:    Oropharynx: oropharynx clear of all foreign objects  Level of Consciousness: drowsy  Oxygen Supplementation: face mask  Level of Supplemental Oxygen (L/min / FiO2): 6  Independent Airway: airway patency satisfactory and stable  Dentition: dentition unchanged  Vital Signs Stable: post-procedure vital signs reviewed and stable    Patient transferred to: PACU    Handoff Report: Identifed the Patient, Identified the Reponsible Provider, Reviewed the pertinent medical history, Discussed the surgical course, Reviewed Intra-OP anesthesia mangement and issues during anesthesia, Set expectations for post-procedure period and Allowed opportunity for questions and acknowledgement of understanding      Vitals:  Vitals Value Taken Time   /87 04/11/23 1756   Temp 97.4 f    Pulse 78 04/11/23 1758   Resp 20 04/11/23 1758   SpO2 99 % 04/11/23 1758   Vitals shown include unvalidated device data.    Electronically Signed By: FRANCESCO Jones CRNA  April 11, 2023  6:00 PM

## 2023-04-11 NOTE — ANESTHESIA PROCEDURE NOTES
Airway       Patient location during procedure: OR       Procedure Start/Stop Times: 4/11/2023 3:46 PM  Staff -        CRNA: Amelia Quintero APRN CRNA       Performed By: CRNAIndications and Patient Condition       Indications for airway management: thee-procedural       Induction type:intravenous       Mask difficulty assessment: 1 - vent by mask    Final Airway Details       Final airway type: endotracheal airway       Successful airway: ETT - single and Oral  Endotracheal Airway Details        ETT size (mm): 7.0       Cuffed: yes       Cuff volume (mL): 10       Successful intubation technique: direct laryngoscopy       DL Blade Type: Quesada 2       Grade View of Cords: 1       Adjucts: stylet       Position: Right       Measured from: lips       Secured at (cm): 21       Bite block used: None    Post intubation assessment        Placement verified by: capnometry, equal breath sounds and chest rise        Number of attempts at approach: 1       Secured with: silk tape       Ease of procedure: easy       Dentition: Intact and Unchanged       Dental guard used and removed. Dental Guard Type: Proguard Red.    Medication(s) Administered   Medication Administration Time: 4/11/2023 3:46 PM

## 2023-04-11 NOTE — ANESTHESIA PREPROCEDURE EVALUATION
Anesthesia Pre-Procedure Evaluation    Patient: Ritu Jeffery   MRN: [id number] : 1950        Procedure : Procedure(s):  ROBOTIC ASSISTED TOTAL LAPAROSCOPIC HYSTERECTOMY, BILATERAL SALPINGO OOPHORECTOMY, SENTINEL LYMPH NODE BIOPSY, POSSIBLE STAGING          Past Medical History:   Diagnosis Date     Bilateral tubo-ovarian mass      CKD (chronic kidney disease) stage 2, GFR 60-89 ml/min      Glaucoma suspect, bilateral      HLD (hyperlipidemia)      Hypertension      Thickened endometrium       Past Surgical History:   Procedure Laterality Date     CATARACT EXTRACTION Bilateral      DILATION AND CURETTAGE       ENT SURGERY      tonsil and adenoidectomy     TUBAL LIGATION        Allergies   Allergen Reactions     Albuterol Hives     Bee Venom       Social History     Tobacco Use     Smoking status: Former     Types: Cigarettes     Smokeless tobacco: Never   Vaping Use     Vaping status: Not on file   Substance Use Topics     Alcohol use: Yes      Wt Readings from Last 1 Encounters:   23 78.7 kg (173 lb 6.4 oz)        Anesthesia Evaluation   Pt has had prior anesthetic.     No history of anesthetic complications       ROS/MED HX  ENT/Pulmonary:  - neg pulmonary ROS     Neurologic:  - neg neurologic ROS     Cardiovascular:     (+) Dyslipidemia hypertension-----    METS/Exercise Tolerance: >4 METS    Hematologic:  - neg hematologic  ROS     Musculoskeletal:  - neg musculoskeletal ROS     GI/Hepatic:       Renal/Genitourinary:     (+) renal disease,     Endo:  - neg endo ROS     Psychiatric/Substance Use:  - neg psychiatric ROS     Infectious Disease:  - neg infectious disease ROS     Malignancy:       Other:            Physical Exam    Airway        Mallampati: II   TM distance: > 3 FB   Neck ROM: full     Respiratory Devices and Support         Dental       (+) Modest Abnormalities - crowns, retainers, 1 or 2 missing teeth      Cardiovascular          Rhythm and rate: regular     Pulmonary            breath sounds clear to auscultation           OUTSIDE LABS:  CBC: No results found for: WBC, HGB, HCT, PLT  BMP: No results found for: NA, POTASSIUM, CHLORIDE, CO2, BUN, CR, GLC  COAGS: No results found for: PTT, INR, FIBR  POC: No results found for: BGM, HCG, HCGS  HEPATIC: No results found for: ALBUMIN, PROTTOTAL, ALT, AST, GGT, ALKPHOS, BILITOTAL, BILIDIRECT, JUNIOR  OTHER: No results found for: PH, LACT, A1C, WAYNE, PHOS, MAG, LIPASE, AMYLASE, TSH, T4, T3, CRP, SED    Anesthesia Plan    ASA Status:  2   NPO Status:  NPO Appropriate    Anesthesia Type: General.     - Airway: ETT   Induction: Intravenous, Propofol.   Maintenance: Balanced.        Consents    Anesthesia Plan(s) and associated risks, benefits, and realistic alternatives discussed. Questions answered and patient/representative(s) expressed understanding.    - Discussed:     - Discussed with:  Patient    Use of blood products discussed: Yes.     - Discussed with: Patient.     - Consented: consented to blood products            Reason for refusal: other.     Postoperative Care    Pain management: Multi-modal analgesia.   PONV prophylaxis: Background Propofol Infusion, Ondansetron (or other 5HT-3), Dexamethasone or Solumedrol     Comments:                Angelica Vasquez MD

## 2023-04-11 NOTE — INTERVAL H&P NOTE
I have seen and examined the patient. There are no updates or changes to the preoperative history and physical.    Rosetta Corley MD  Gynecologic Oncology  [facility]  [facility]: [phone]

## 2023-04-12 ASSESSMENT — ACTIVITIES OF DAILY LIVING (ADL)
ADLS_ACUITY_SCORE: 35

## 2023-04-12 NOTE — ANESTHESIA POSTPROCEDURE EVALUATION
Patient: Ritu Jeffery    Procedure: Procedure(s):  ROBOTIC ASSISTED TOTAL LAPAROSCOPIC HYSTERECTOMY, BILATERAL SALPINGO OOPHORECTOMY, OMENTAL BIOPSY       Anesthesia Type:  General    Note:  Disposition: Inpatient   Postop Pain Control: Uneventful            Sign Out: Well controlled pain   PONV: No   Neuro/Psych: Uneventful            Sign Out: Acceptable/Baseline neuro status   Airway/Respiratory: Uneventful            Sign Out: Acceptable/Baseline resp. status   CV/Hemodynamics: Uneventful            Sign Out: Acceptable CV status; No obvious hypovolemia; No obvious fluid overload   Other NRE: NONE   DID A NON-ROUTINE EVENT OCCUR? No           Last vitals:  Vitals Value Taken Time   /75 04/11/23 1900   Temp     Pulse 70 04/11/23 1914   Resp 11 04/11/23 1914   SpO2 89 % 04/11/23 1914   Vitals shown include unvalidated device data.    Electronically Signed By: Laurel Rangel MD  April 11, 2023  10:38 PM

## 2023-04-13 LAB
PATH REPORT.COMMENTS IMP SPEC: NORMAL
PATH REPORT.FINAL DX SPEC: NORMAL
PATH REPORT.GROSS SPEC: NORMAL
PATH REPORT.INTRAOP OBS SPEC DOC: NORMAL
PATH REPORT.MICROSCOPIC SPEC OTHER STN: NORMAL
PATH REPORT.RELEVANT HX SPEC: NORMAL
PHOTO IMAGE: NORMAL

## 2023-04-13 PROCEDURE — 88305 TISSUE EXAM BY PATHOLOGIST: CPT | Mod: 26 | Performed by: PATHOLOGY

## 2023-04-13 PROCEDURE — 88307 TISSUE EXAM BY PATHOLOGIST: CPT | Mod: 26 | Performed by: PATHOLOGY

## 2023-04-13 PROCEDURE — 88331 PATH CONSLTJ SURG 1 BLK 1SPC: CPT | Mod: 26 | Performed by: PATHOLOGY

## 2023-04-13 ASSESSMENT — ACTIVITIES OF DAILY LIVING (ADL)
ADLS_ACUITY_SCORE: 35

## 2023-04-14 ASSESSMENT — ACTIVITIES OF DAILY LIVING (ADL)
ADLS_ACUITY_SCORE: 35

## 2023-04-18 LAB
PATH REPORT.COMMENTS IMP SPEC: NORMAL
PATH REPORT.COMMENTS IMP SPEC: NORMAL
PATH REPORT.FINAL DX SPEC: NORMAL
PATH REPORT.GROSS SPEC: NORMAL
PATH REPORT.MICROSCOPIC SPEC OTHER STN: NORMAL
PATH REPORT.RELEVANT HX SPEC: NORMAL

## 2023-04-18 PROCEDURE — 88305 TISSUE EXAM BY PATHOLOGIST: CPT | Mod: 26 | Performed by: PATHOLOGY

## 2023-04-18 PROCEDURE — 88112 CYTOPATH CELL ENHANCE TECH: CPT | Mod: 26 | Performed by: PATHOLOGY

## (undated) DEVICE — PROTECTOR ARM STANDARD ONE STEP

## (undated) DEVICE — PREP CHLORAPREP 26ML TINTED HI-LITE ORANGE 930815

## (undated) DEVICE — PAD POS XL 1X20X40IN PINK PIGAZZI

## (undated) DEVICE — DRAPE POUCH INSTRUMENT 3 POCKET 1018L

## (undated) DEVICE — GLOVE BIOGEL PI ULTRATOUCH G SZ 7.0 42170

## (undated) DEVICE — DAVINCI XI OBTURATOR BLADELESS 8MM 470359

## (undated) DEVICE — GLOVE UNDER INDICATOR PI SZ 7.0 LF 41670

## (undated) DEVICE — DAVINCI XI DRAPE COLUMN 470341

## (undated) DEVICE — SYR 20ML LL W/O NDL 302830

## (undated) DEVICE — SU STRATAFIX PDS 0 CT-2 30CM SXPP1B405

## (undated) DEVICE — GOWN IMPERVIOUS BREATHABLE SMART LG 89015

## (undated) DEVICE — DRAPE LAP/CHOLE W/O POUCH 89225

## (undated) DEVICE — NEEDLE HYPO 18X1-1/2 SAFETY 305918

## (undated) DEVICE — CUSTOM PACK DA VINCI GYN SMA5BDVHEA

## (undated) DEVICE — DRAPE SHEET TABLE COVER KC 42301*

## (undated) DEVICE — SYR 50ML SLIP TIP W/O NDL 309654

## (undated) DEVICE — LEGGINGS 31X48" LF KM89408

## (undated) DEVICE — SU MONOCRYL 3-0 PS-2 18" UND MCP497G

## (undated) DEVICE — TUBING LAP SUCT/IRRIG STRYKER 250070500

## (undated) DEVICE — LUBRICANT INST ELECTROLUBE EL101

## (undated) DEVICE — RETR ELEV / UTERINE MANIPULATOR V-CARE MED CUP 60-6085-201A

## (undated) DEVICE — TRAP SPECIMAN 5CC-40CC DYND44140

## (undated) DEVICE — SOL WATER IRRIG 1000ML BOTTLE 2F7114

## (undated) DEVICE — DAVINCI HOT SHEARS TIP COVER  400180

## (undated) DEVICE — SOL NACL 0.9% INJ 1000ML BAG 2B1324X

## (undated) DEVICE — SYR 10ML FINGER CONTROL W/O NDL 309695

## (undated) DEVICE — GLOVE BIOGEL PI ULTRATOUCH G SZ 6.5 42165

## (undated) DEVICE — BLADE KNIFE SURG 11 371111

## (undated) DEVICE — NEEDLE SPINAL DISP 22X4-3/4 QUIN 333315

## (undated) DEVICE — TUBING FILTER TRI-LUMEN AIRSEAL ASC-EVAC1

## (undated) DEVICE — DECANTER VIAL 2006S

## (undated) DEVICE — TRENGUARD 450 PROCEDURAL PACK 111404

## (undated) DEVICE — ENDO OBTURATOR ACCESS PORT BLADELESS 8X100MM IAS8-100LP

## (undated) DEVICE — DAVINCI XI HANDPIECE ESU VESSEL SEALER 8MM EXT 480422

## (undated) DEVICE — DRAPE U SPLIT 74X120" 29440

## (undated) DEVICE — SYSTEM LAPAROVUE VISIBILITY LAPVUE10

## (undated) DEVICE — SUTURE VICRYL+ 0 36IN CT-1 UND VCP946H

## (undated) DEVICE — SURGICEL ABSORBABLE HEMOSTAT SNOW 4"X4" 2083

## (undated) DEVICE — DAVINCI XI DRAPE ARM 470015

## (undated) DEVICE — DRAPE IOBAN INCISE 23X17" 6650EZ

## (undated) DEVICE — PREP DYNA-HEX 4% CHG SCRUB 4OZ BOTTLE MDS098710

## (undated) DEVICE — SUCTION MANIFOLD NEPTUNE 2 SYS 1 PORT 702-025-000

## (undated) DEVICE — NEEDLE HYPO 22X1-1/2 SAFETY 305900

## (undated) DEVICE — MAT FLOOR SURGICAL 40X38 0702140238

## (undated) DEVICE — BAG TISSUE RETRIEVAL ANCHOR C4000ML 25MM TRS-TV-25

## (undated) DEVICE — ADH SKIN CLOSURE PREMIERPRO EXOFIN 1.0ML 3470

## (undated) DEVICE — DAVINCI XI SEAL UNIVERSAL 5-8MM 470361

## (undated) RX ORDER — BUPIVACAINE HYDROCHLORIDE 5 MG/ML
INJECTION, SOLUTION EPIDURAL; INTRACAUDAL
Status: DISPENSED
Start: 2023-04-11

## (undated) RX ORDER — INDOCYANINE GREEN AND WATER 25 MG
KIT INJECTION
Status: DISPENSED
Start: 2023-04-11

## (undated) RX ORDER — FENTANYL CITRATE 50 UG/ML
INJECTION, SOLUTION INTRAMUSCULAR; INTRAVENOUS
Status: DISPENSED
Start: 2023-04-11

## (undated) RX ORDER — FENTANYL CITRATE-0.9 % NACL/PF 10 MCG/ML
PLASTIC BAG, INJECTION (ML) INTRAVENOUS
Status: DISPENSED
Start: 2023-04-11